# Patient Record
Sex: FEMALE | Race: WHITE | Employment: UNEMPLOYED | URBAN - METROPOLITAN AREA
[De-identification: names, ages, dates, MRNs, and addresses within clinical notes are randomized per-mention and may not be internally consistent; named-entity substitution may affect disease eponyms.]

---

## 2018-08-18 ENCOUNTER — HOSPITAL ENCOUNTER (EMERGENCY)
Age: 23
Discharge: HOME | End: 2018-08-18
Attending: EMERGENCY MEDICINE
Payer: MEDICAID

## 2018-08-18 ENCOUNTER — APPOINTMENT (OUTPATIENT)
Dept: GENERAL RADIOLOGY | Age: 23
End: 2018-08-18
Payer: MEDICAID

## 2018-08-18 VITALS
BODY MASS INDEX: 38.13 KG/M2 | DIASTOLIC BLOOD PRESSURE: 82 MMHG | RESPIRATION RATE: 16 BRPM | OXYGEN SATURATION: 96 % | HEART RATE: 102 BPM | TEMPERATURE: 98.5 F | SYSTOLIC BLOOD PRESSURE: 110 MMHG | WEIGHT: 236.25 LBS

## 2018-08-18 DIAGNOSIS — L03.114 CELLULITIS OF LEFT UPPER EXTREMITY: Primary | ICD-10-CM

## 2018-08-18 PROCEDURE — 73140 X-RAY EXAM OF FINGER(S): CPT

## 2018-08-18 PROCEDURE — 6370000000 HC RX 637 (ALT 250 FOR IP): Performed by: EMERGENCY MEDICINE

## 2018-08-18 PROCEDURE — 99283 EMERGENCY DEPT VISIT LOW MDM: CPT

## 2018-08-18 RX ORDER — CLINDAMYCIN HYDROCHLORIDE 150 MG/1
450 CAPSULE ORAL 3 TIMES DAILY
Qty: 63 CAPSULE | Refills: 0 | Status: SHIPPED | OUTPATIENT
Start: 2018-08-18 | End: 2018-08-25

## 2018-08-18 RX ORDER — ALBUTEROL SULFATE 90 UG/1
2 AEROSOL, METERED RESPIRATORY (INHALATION) EVERY 6 HOURS PRN
COMMUNITY

## 2018-08-18 RX ORDER — HYDROCODONE BITARTRATE AND ACETAMINOPHEN 5; 325 MG/1; MG/1
2 TABLET ORAL ONCE
Status: DISCONTINUED | OUTPATIENT
Start: 2018-08-18 | End: 2018-08-18

## 2018-08-18 RX ORDER — CLINDAMYCIN HYDROCHLORIDE 150 MG/1
450 CAPSULE ORAL ONCE
Status: DISCONTINUED | OUTPATIENT
Start: 2018-08-18 | End: 2018-08-18 | Stop reason: SDUPTHER

## 2018-08-18 RX ORDER — CLINDAMYCIN HYDROCHLORIDE 300 MG/1
600 CAPSULE ORAL ONCE
Status: COMPLETED | OUTPATIENT
Start: 2018-08-18 | End: 2018-08-18

## 2018-08-18 RX ORDER — HYDROCODONE BITARTRATE AND ACETAMINOPHEN 5; 325 MG/1; MG/1
1 TABLET ORAL ONCE
Status: COMPLETED | OUTPATIENT
Start: 2018-08-18 | End: 2018-08-18

## 2018-08-18 RX ADMIN — CLINDAMYCIN HYDROCHLORIDE 600 MG: 300 CAPSULE ORAL at 21:26

## 2018-08-18 RX ADMIN — HYDROCODONE BITARTRATE AND ACETAMINOPHEN 1 TABLET: 5; 325 TABLET ORAL at 21:26

## 2018-08-18 ASSESSMENT — PAIN DESCRIPTION - ORIENTATION: ORIENTATION: MID;LEFT

## 2018-08-18 ASSESSMENT — PAIN DESCRIPTION - DESCRIPTORS: DESCRIPTORS: CONSTANT;THROBBING;OTHER (COMMENT)

## 2018-08-18 ASSESSMENT — PAIN SCALES - GENERAL
PAINLEVEL_OUTOF10: 8
PAINLEVEL_OUTOF10: 8

## 2018-08-18 ASSESSMENT — PAIN DESCRIPTION - LOCATION: LOCATION: FINGER (COMMENT WHICH ONE)

## 2018-08-18 ASSESSMENT — PAIN DESCRIPTION - PAIN TYPE: TYPE: ACUTE PAIN

## 2018-08-19 NOTE — ED PROVIDER NOTES
CHIEF COMPLAINT  Finger Pain (Pt was helping brother about 1 week ago with something and think she got a splinter in left middle finger. Now with redness/swelling/pain. States pulled a splinter out yesterday. Ibuprofen in use at home. )      [facility]  Zahraa Faith is a 21 y.o. female who presents to the ED complaining of left middle finger pain. Patient states that she has splinter into it. She was able to remove the splinter yesterday and has been losing however the pain and redness and swelling worsened. She states that she came in today because she thinks that she may need to be put on antibiotics. Patient states that she is not having any fevers or chills. She denies any other symptoms at this time. She states that the finger does feel full but she is able to move it and there is some throbbing inside of it. She states that movement seems to make it worse however flexion and extension is not any worse than other movement. No other complaints, modifying factors or associated symptoms. Nursing notes reviewed. Past Medical History:   Diagnosis Date    Anxiety     Asthma     Hip fracture ([facility] [address]     Leg fracture      History reviewed. No pertinent surgical history. History reviewed. No pertinent family history. Social History     Social History    Marital status: Single     Spouse name: N/A    Number of children: N/A    Years of education: N/A     Occupational History    Not on file.      Social History Main Topics    Smoking status: Current Every Day Smoker     Packs/day: 1.00     Types: Cigarettes    Smokeless tobacco: Never Used      Comment: Quit 4 months ago    Alcohol use No    Drug use: No    Sexual activity: Yes     Partners: Male     Other Topics Concern    Not on file     Social History Narrative    No narrative on file     Current Facility-Administered Medications   Medication Dose Route Frequency Provider Last Rate Last Dose    clindamycin (CLEOCIN) capsule 450 mg  450 mg Oral Once Latonia Or, DO         Current Outpatient Prescriptions   Medication Sig Dispense Refill    albuterol sulfate  (90 Base) MCG/ACT inhaler Inhale 2 puffs into the lungs every 6 hours as needed for Wheezing      clindamycin (CLEOCIN) 150 MG capsule Take 3 capsules by mouth 3 times daily for 7 days 63 capsule 0    busPIRone (BUSPAR) 5 MG tablet Take 5 mg by mouth 2 times daily      oxyCODONE-acetaminophen (PERCOCET) 5-325 MG per tablet Take 1 tablet by mouth every 4 hours as needed for Pain . 10 tablet 0     Allergies   Allergen Reactions    Tramadol        REVIEW OF SYSTEMS  6 systems reviewed, pertinent positives per HPI otherwise noted to be negative    PHYSICAL EXAM  /82   Pulse 102   Temp 98.5 °F (36.9 °C) (Oral)   Resp 16   Wt 107.2 kg (236 lb 4 oz)   LMP 08/06/2018   SpO2 96%   BMI 38.13 kg/m²   GENERAL APPEARANCE: Awake and alert. Cooperative. No acute distress. HEAD: Normocephalic. Atraumatic. EYES: PERRL. EOM's grossly intact. ENT: Mucous membranes are moist.   NECK: Supple. Normal ROM. CHEST: Equal symmetric chest rise. LUNGS: Breathing is unlabored. Speaking comfortably in full sentences. EXTREMITIES: . MAEE. No acute deformities. All extremities neurovascularly intact. There is some erythema at the PIP LEFT MIDDLE FINGER. THERE IS NO OBVIOUS FLUCTUANCE. IT IS ERYTHEMATOUS. THERE IS NO SIGNIFICANT DISCOMFORT WITH EXTENSION. THE FINGER IS NOT HELD IN EXTENSION. SKIN: Warm and dry. NEUROLOGICAL: Alert and oriented. Normal coordination. Gait normal.         RADIOLOGY  X-RAYS:  I have reviewed radiologic plain film image(s). ALL OTHER NON-PLAIN FILM IMAGES SUCH AS CT, ULTRASOUND AND MRI HAVE BEEN READ BY THE RADIOLOGIST. XR FINGER LEFT (MIN 2 VIEWS)   Final Result      1. Focal soft tissue swelling over the third finger proximal phalanx. No underlying bony abnormality seen.       2.  No discrete radiopaque foreign body identified. Underlying inflammatory or infectious process suspected. Correlate clinically. PROCEDURES    ED COURSE/MDM  Patient seen and evaluated. Patient had x-ray that does show some soft tissue swelling but no obvious abscess. There is no air. The patient was started on antibiotics while in the ED. I discussed results and plan of care with patient. I do feel patient can be safely discharged to home. Patient's vitals were rechecked when I was in the room reevaluating. The patient has a normal heart rate at this time. Recommend follow up with orthopedics and PCP in 1-2 days for re-evaluation. Reasons to RT ED discussed. Patient expresses understanding and is in agreement with plan. Patient was given scripts for the following medications. I counseled patient how to take these medications. New Prescriptions    CLINDAMYCIN (CLEOCIN) 150 MG CAPSULE    Take 3 capsules by mouth 3 times daily for 7 days           CLINICAL IMPRESSION  1. Cellulitis of left upper extremity        Blood pressure 110/82, pulse 102, temperature 98.5 °F (36.9 °C), temperature source Oral, resp. rate 16, weight 107.2 kg (236 lb 4 oz), last menstrual period 08/06/2018, SpO2 96 %, not currently breastfeeding. DISPOSITION  Patient was discharged to home in good condition.        Renetta Bolden,   08/18/18 0276

## 2020-01-01 ENCOUNTER — HOSPITAL ENCOUNTER (EMERGENCY)
Age: 25
Discharge: HOME | End: 2020-10-14
Attending: STUDENT IN AN ORGANIZED HEALTH CARE EDUCATION/TRAINING PROGRAM

## 2020-01-01 VITALS
TEMPERATURE: 98.4 F | SYSTOLIC BLOOD PRESSURE: 116 MMHG | BODY MASS INDEX: 37.12 KG/M2 | OXYGEN SATURATION: 98 % | HEART RATE: 74 BPM | RESPIRATION RATE: 24 BRPM | DIASTOLIC BLOOD PRESSURE: 73 MMHG | WEIGHT: 230 LBS

## 2020-01-01 PROCEDURE — 99284 EMERGENCY DEPT VISIT MOD MDM: CPT

## 2020-01-01 ASSESSMENT — ENCOUNTER SYMPTOMS
VOMITING: 0
BACK PAIN: 0
PHOTOPHOBIA: 0
COUGH: 0
NAUSEA: 0
ABDOMINAL PAIN: 0
SORE THROAT: 0
STRIDOR: 0
RHINORRHEA: 0
SHORTNESS OF BREATH: 0

## 2020-10-14 NOTE — ED PROVIDER NOTES
[address] ED  EMERGENCY DEPARTMENT ENCOUNTER      Pt Name: Cayden Darling  MRN: [id number]  [birth year][place]fdayami 1995  Date of evaluation: 10/14/2020  Provider: Jeremy [address]       Chief Complaint   Patient presents with    Drug Overdose     pt relapsed this morning. took fentanyl. was given 8 mg narcan IN prior to squad arrival.  pt was starting to come around when they got there. pt alert and oriented on arrival.         HISTORY OF PRESENT ILLNESS   (Location/Symptom, Timing/Onset, Context/Setting, Quality, Duration, Modifying Factors, Severity)  Note limiting factors. Cayden Darling is a 22 y.o. female who presents to the emergency department complaining of fentanyl overdose. Patient reports she was clear for 1-1/2 years prior to relapsing today. Patient was given 8 mg Narcan intranasal prior to arrival.  On my evaluation patient awake alert oriented no complaints. Vitals are stable. Nursing Notes were reviewed. PAST MEDICAL HISTORY     Past Medical History:   Diagnosis Date    Anxiety     Asthma     Hip fracture ([facility] [address]     Leg fracture          SURGICAL HISTORY     History reviewed. No pertinent surgical history. CURRENT MEDICATIONS       Discharge Medication List as of 10/14/2020 11:56 AM      CONTINUE these medications which have NOT CHANGED    Details   albuterol sulfate  (90 Base) MCG/ACT inhaler Inhale 2 puffs into the lungs every 6 hours as needed for WheezingHistorical Med      busPIRone (BUSPAR) 5 MG tablet Take 5 mg by mouth 2 times dailyHistorical Med      oxyCODONE-acetaminophen (PERCOCET) 5-325 MG per tablet Take 1 tablet by mouth every 4 hours as needed for Pain ., Disp-10 tablet, R-0Print             ALLERGIES     Tramadol    FAMILY HISTORY     History reviewed. No pertinent family history.        SOCIAL HISTORY       Social History     Socioeconomic History    Marital status: Single     Spouse name: None    Number of children: None    Years of education: None    Highest education level: None   Occupational History    None   Social Needs    Financial resource strain: None    Food insecurity     Worry: None     Inability: None    Transportation needs     Medical: None     Non-medical: None   Tobacco Use    Smoking status: Current Every Day Smoker     Packs/day: 1.00     Types: Cigarettes    Smokeless tobacco: Never Used    Tobacco comment: Quit 4 months ago   Substance and Sexual Activity    Alcohol use: No    Drug use: No    Sexual activity: Yes     Partners: Male   Lifestyle    Physical activity     Days per week: None     Minutes per session: None    Stress: None   Relationships    Social connections     Talks on phone: None     Gets together: None     Attends Hoah[employer] service: None     Active member of club or organization: None     Attends meetings of clubs or organizations: None     Relationship status: None    Intimate partner violence     Fear of current or ex partner: None     Emotionally abused: None     Physically abused: None     Forced sexual activity: None   Other Topics Concern    None   Social History Narrative    None       SCREENINGS        [place] Coma Scale  Eye Opening: Spontaneous  Best Verbal Response: Oriented  Best Motor Response: Obeys commands  [place] Coma Scale Score: 15                   REVIEW OF SYSTEMS    (2-9 systems for level 4, 10 or more for level 5)   Review of Systems   Constitutional: Negative for chills and fever. HENT: Negative for congestion, rhinorrhea and sore throat. Eyes: Negative for photophobia and visual disturbance. Respiratory: Negative for cough, shortness of breath and stridor. Cardiovascular: Negative for chest pain, palpitations and leg swelling. Gastrointestinal: Negative for abdominal pain, nausea and vomiting. Musculoskeletal: Negative for back pain, neck pain and neck stiffness. Skin: Negative for rash.    Allergic/Immunologic: Negative for environmental allergies. Hematological: Negative for adenopathy. Psychiatric/Behavioral: Positive for behavioral problems. Negative for confusion. PHYSICAL EXAM    (up to 7 for level 4, 8 or more for level 5)     ED Triage Vitals [10/14/20 0920]   BP Temp Temp Source Pulse Resp SpO2 Height Weight   -- 98.4 °F (36.9 °C) Oral 98 18 99 % -- 230 lb (104.3 kg)       Physical Exam  Constitutional:       General: She is not in acute distress. Appearance: She is not diaphoretic. HENT:      Head: Normocephalic and atraumatic. Eyes:      Pupils: Pupils are equal, round, and reactive to light. Neck:      Musculoskeletal: Normal range of motion and neck supple. Trachea: No tracheal deviation. Cardiovascular:      Rate and Rhythm: Normal rate and regular rhythm. Pulmonary:      Effort: Pulmonary effort is normal. No respiratory distress. Abdominal:      General: There is no distension. Palpations: Abdomen is soft. Musculoskeletal: Normal range of motion. Skin:     General: Skin is warm. Neurological:      General: No focal deficit present. Mental Status: She is oriented to person, place, and time. Sensory: No sensory deficit. Motor: No weakness. DIAGNOSTIC RESULTS     EKG: All EKG's are interpreted by the Emergency Department Physician who either signs or Co-signs this chart in the absence of a cardiologist.        RADIOLOGY:   Non-plain film images such as CT, Ultrasound and MRI are read by the radiologist. Plain radiographic images are visualized and preliminarily interpreted by the emergency physician. Interpretation per the Radiologist below, if available at the time of this note:    No orders to display         LABS:  Labs Reviewed - No data to display    All other labs were within normal range or not returned as of this dictation.     EMERGENCY DEPARTMENT COURSE and DIFFERENTIAL DIAGNOSIS/MDM:   Anais Carrasco is a 22 y.o. female who presents to the emergency department with the complaint of fentanyl overdose, did receive 8 mg IM Narcan prior to arrival.  On arrival patient awake oriented with stable vital signs, awake answering questions appropriately lung sounds are clear bilaterally, patient is not tachycardic tachypneic or hypoxic. Abdomen soft nontender. We will plan to monitor patient here in the ER, notification at this time for imaging or lab work. Patient was monitored for greater than 2 hours emergency department with out recurrence of opiate toxidrome. This time I feel patient is medically stable for discharge home in the care of family friend. Patient instructed she needs to remain near friends at all times as there is potential for Narcan to wear off and recurrence of opiate toxidrome. Patient is able to ambulate out of the ER without difficulty. CRITICAL CARE TIME   Total Critical Care time was at least 0 minutes, excluding separately reportable procedures. There was a high probability of clinically significant/life threatening deterioration in the patient's condition which required my urgent intervention. Clinical concern   Intervention     CONSULTS:  None    PROCEDURES:  Unless otherwise noted below, none     Procedures        FINAL IMPRESSION      1. Opiate overdose, accidental or unintentional, initial encounter [facility])          DISPOSITION/PLAN   DISPOSITION Decision To Discharge 10/14/2020 11:37:01 AM      PATIENT REFERRED TO:  [place] [facility]  [address] [facility]  [phone]    If symptoms worsen      DISCHARGE MEDICATIONS:  Discharge Medication List as of 10/14/2020 11:56 AM        Controlled Substances Monitoring:     No flowsheet data found.     (Please note that portions of this note were completed with a voice recognition program.  Efforts were made to edit the dictations but occasionally words are mis-transcribed.)    Jessica Hayes DO (electronically signed)  Attending Emergency Physician            Janae Duggan DO  10/14/20 1942

## 2021-01-01 ENCOUNTER — APPOINTMENT (OUTPATIENT)
Dept: GENERAL RADIOLOGY | Age: 26
DRG: 917 | End: 2021-01-01

## 2021-01-01 ENCOUNTER — HOSPITAL ENCOUNTER (INPATIENT)
Age: 26
LOS: 1 days | DRG: 917 | End: 2021-05-06
Attending: EMERGENCY MEDICINE | Admitting: INTERNAL MEDICINE

## 2021-01-01 ENCOUNTER — APPOINTMENT (OUTPATIENT)
Dept: CT IMAGING | Age: 26
DRG: 917 | End: 2021-01-01

## 2021-01-01 VITALS
TEMPERATURE: 99 F | RESPIRATION RATE: 25 BRPM | OXYGEN SATURATION: 98 % | WEIGHT: 230.8 LBS | HEART RATE: 130 BPM | SYSTOLIC BLOOD PRESSURE: 94 MMHG | DIASTOLIC BLOOD PRESSURE: 57 MMHG

## 2021-01-01 DIAGNOSIS — G93.1 ANOXIC BRAIN INJURY (HCC): ICD-10-CM

## 2021-01-01 DIAGNOSIS — I46.9 CARDIAC ARREST (HCC): Primary | ICD-10-CM

## 2021-01-01 LAB
A/G RATIO: 1.1 (ref 1.1–2.2)
ACETAMINOPHEN LEVEL: <5 UG/ML (ref 10–30)
ALBUMIN SERPL-MCNC: 2.9 G/DL (ref 3.4–5)
ALP BLD-CCNC: 211 U/L (ref 40–129)
ALT SERPL-CCNC: 123 U/L (ref 10–40)
AMPHETAMINE SCREEN, URINE: POSITIVE
ANION GAP SERPL CALCULATED.3IONS-SCNC: 12 MMOL/L (ref 3–16)
ANION GAP SERPL CALCULATED.3IONS-SCNC: 20 MMOL/L (ref 3–16)
AST SERPL-CCNC: 215 U/L (ref 15–37)
ATYPICAL LYMPHOCYTE RELATIVE PERCENT: 1 % (ref 0–6)
BACTERIA: ABNORMAL /HPF
BANDED NEUTROPHILS RELATIVE PERCENT: 30 % (ref 0–7)
BANDED NEUTROPHILS RELATIVE PERCENT: 35 % (ref 0–7)
BARBITURATE SCREEN URINE: ABNORMAL
BASE EXCESS ARTERIAL: -12.7 MMOL/L (ref -3–3)
BASE EXCESS ARTERIAL: -9 (ref -3–3)
BASE EXCESS VENOUS: -18.9 MMOL/L (ref -3–3)
BASOPHILS ABSOLUTE: 0 K/UL (ref 0–0.2)
BASOPHILS ABSOLUTE: 0 K/UL (ref 0–0.2)
BASOPHILS RELATIVE PERCENT: 0 %
BASOPHILS RELATIVE PERCENT: 0 %
BENZODIAZEPINE SCREEN, URINE: ABNORMAL
BILIRUB SERPL-MCNC: 0.9 MG/DL (ref 0–1)
BILIRUBIN URINE: NEGATIVE
BLOOD, URINE: ABNORMAL
BUN BLDV-MCNC: 16 MG/DL (ref 7–20)
BUN BLDV-MCNC: 9 MG/DL (ref 7–20)
CALCIUM IONIZED: 1 MMOL/L (ref 1.12–1.32)
CALCIUM SERPL-MCNC: 7.8 MG/DL (ref 8.3–10.6)
CALCIUM SERPL-MCNC: 7.8 MG/DL (ref 8.3–10.6)
CANNABINOID SCREEN URINE: ABNORMAL
CARBOXYHEMOGLOBIN ARTERIAL: 0.1 % (ref 0–1.5)
CARBOXYHEMOGLOBIN: 2.3 % (ref 0–1.5)
CHLORIDE BLD-SCNC: 104 MMOL/L (ref 99–110)
CHLORIDE BLD-SCNC: 95 MMOL/L (ref 99–110)
CLARITY: CLEAR
CO2: 14 MMOL/L (ref 21–32)
CO2: 20 MMOL/L (ref 21–32)
COCAINE METABOLITE SCREEN URINE: ABNORMAL
COLOR: YELLOW
CREAT SERPL-MCNC: 1.4 MG/DL (ref 0.6–1.1)
CREAT SERPL-MCNC: 1.6 MG/DL (ref 0.6–1.1)
EKG ATRIAL RATE: 90 BPM
EKG DIAGNOSIS: NORMAL
EKG P AXIS: 74 DEGREES
EKG P-R INTERVAL: 204 MS
EKG Q-T INTERVAL: 404 MS
EKG QRS DURATION: 108 MS
EKG QTC CALCULATION (BAZETT): 494 MS
EKG R AXIS: 75 DEGREES
EKG T AXIS: 27 DEGREES
EKG VENTRICULAR RATE: 90 BPM
EOSINOPHILS ABSOLUTE: 0.3 K/UL (ref 0–0.6)
EOSINOPHILS ABSOLUTE: 0.4 K/UL (ref 0–0.6)
EOSINOPHILS RELATIVE PERCENT: 1 %
EOSINOPHILS RELATIVE PERCENT: 1 %
EPITHELIAL CELLS, UA: ABNORMAL /HPF (ref 0–5)
ESTIMATED AVERAGE GLUCOSE: 102.5 MG/DL
GFR AFRICAN AMERICAN: 47
GFR AFRICAN AMERICAN: 55
GFR NON-AFRICAN AMERICAN: 39
GFR NON-AFRICAN AMERICAN: 45
GLOBULIN: 2.7 G/DL
GLUCOSE BLD-MCNC: 104 MG/DL (ref 70–99)
GLUCOSE BLD-MCNC: 195 MG/DL (ref 70–99)
GLUCOSE BLD-MCNC: 323 MG/DL (ref 70–99)
GLUCOSE URINE: 500 MG/DL
HBA1C MFR BLD: 5.2 %
HCG QUALITATIVE: NEGATIVE
HCO3 ARTERIAL: 19.3 MMOL/L (ref 21–29)
HCO3 ARTERIAL: 19.6 MMOL/L (ref 21–29)
HCO3 VENOUS: 12.7 MMOL/L (ref 23–29)
HCT VFR BLD CALC: 34.7 % (ref 36–48)
HCT VFR BLD CALC: 39.7 % (ref 36–48)
HEMATOLOGY PATH CONSULT: NO
HEMATOLOGY PATH CONSULT: NORMAL
HEMATOLOGY PATH CONSULT: YES
HEMOGLOBIN, ART, EXTENDED: 13.7 G/DL (ref 12–16)
HEMOGLOBIN: 10.4 G/DL (ref 12–16)
HEMOGLOBIN: 12.5 G/DL (ref 12–16)
HEMOGLOBIN: 13.4 GM/DL (ref 12–16)
KETONES, URINE: NEGATIVE MG/DL
LACTATE: 4.63 MMOL/L (ref 0.4–2)
LACTIC ACID: 11.1 MMOL/L (ref 0.4–2)
LACTIC ACID: 3.6 MMOL/L (ref 0.4–2)
LEUKOCYTE ESTERASE, URINE: NEGATIVE
LV EF: 20 %
LVEF MODALITY: NORMAL
LYMPHOCYTES ABSOLUTE: 7 K/UL (ref 1–5.1)
LYMPHOCYTES ABSOLUTE: 9.7 K/UL (ref 1–5.1)
LYMPHOCYTES RELATIVE PERCENT: 25 %
LYMPHOCYTES RELATIVE PERCENT: 26 %
Lab: ABNORMAL
MCH RBC QN AUTO: 27.2 PG (ref 26–34)
MCH RBC QN AUTO: 27.2 PG (ref 26–34)
MCHC RBC AUTO-ENTMCNC: 30.1 G/DL (ref 31–36)
MCHC RBC AUTO-ENTMCNC: 31.5 G/DL (ref 31–36)
MCV RBC AUTO: 86.5 FL (ref 80–100)
MCV RBC AUTO: 90.4 FL (ref 80–100)
METAMYELOCYTES RELATIVE PERCENT: 2 %
METAMYELOCYTES RELATIVE PERCENT: 2 %
METHADONE SCREEN, URINE: ABNORMAL
METHEMOGLOBIN ARTERIAL: 0.8 %
METHEMOGLOBIN VENOUS: 0.8 %
MICROSCOPIC EXAMINATION: YES
MONOCYTES ABSOLUTE: 0.8 K/UL (ref 0–1.3)
MONOCYTES ABSOLUTE: 1.2 K/UL (ref 0–1.3)
MONOCYTES RELATIVE PERCENT: 3 %
MONOCYTES RELATIVE PERCENT: 3 %
MUCUS: ABNORMAL /LPF
NEUTROPHILS ABSOLUTE: 18 K/UL (ref 1.7–7.7)
NEUTROPHILS ABSOLUTE: 27.5 K/UL (ref 1.7–7.7)
NEUTROPHILS RELATIVE PERCENT: 34 %
NEUTROPHILS RELATIVE PERCENT: 37 %
NITRITE, URINE: NEGATIVE
O2 CONTENT ARTERIAL: 18 ML/DL
O2 CONTENT, VEN: 16 VOL %
O2 SAT, ARTERIAL: 91.7 %
O2 SAT, ARTERIAL: 94 % (ref 93–100)
O2 SAT, VEN: 97 %
O2 THERAPY: ABNORMAL
O2 THERAPY: ABNORMAL
OPIATE SCREEN URINE: ABNORMAL
OXYCODONE URINE: ABNORMAL
PCO2 ARTERIAL: 52.5 MM HG (ref 35–45)
PCO2 ARTERIAL: 76 MMHG (ref 35–45)
PCO2, VEN: 56.2 MMHG (ref 40–50)
PDW BLD-RTO: 13.6 % (ref 12.4–15.4)
PDW BLD-RTO: 14 % (ref 12.4–15.4)
PERFORMED ON: ABNORMAL
PH ARTERIAL: 7.02 (ref 7.35–7.45)
PH ARTERIAL: 7.18 (ref 7.35–7.45)
PH UA: 8.5
PH UA: 8.5 (ref 5–8)
PH VENOUS: 6.97 (ref 7.35–7.45)
PHENCYCLIDINE SCREEN URINE: ABNORMAL
PHOSPHORUS: 7.9 MG/DL (ref 2.5–4.9)
PLATELET # BLD: 165 K/UL (ref 135–450)
PLATELET # BLD: 218 K/UL (ref 135–450)
PLATELET SLIDE REVIEW: ADEQUATE
PLATELET SLIDE REVIEW: ADEQUATE
PMV BLD AUTO: 7.2 FL (ref 5–10.5)
PMV BLD AUTO: 8.1 FL (ref 5–10.5)
PO2 ARTERIAL: 78.1 MMHG (ref 75–108)
PO2 ARTERIAL: 90.4 MM HG (ref 75–108)
PO2, VEN: 136.3 MMHG (ref 25–40)
POC HEMATOCRIT: 39 % (ref 36–48)
POC POTASSIUM: 3.3 MMOL/L (ref 3.5–5.1)
POC SAMPLE TYPE: ABNORMAL
POC SODIUM: 138 MMOL/L (ref 136–145)
POTASSIUM REFLEX MAGNESIUM: 4.1 MMOL/L (ref 3.5–5.1)
POTASSIUM SERPL-SCNC: 4.3 MMOL/L (ref 3.5–5.1)
POTASSIUM SERPL-SCNC: 7.3 MMOL/L (ref 3.5–5.1)
PRO-BNP: 502 PG/ML (ref 0–124)
PROPOXYPHENE SCREEN: ABNORMAL
PROTEIN UA: >=300 MG/DL
RBC # BLD: 3.84 M/UL (ref 4–5.2)
RBC # BLD: 4.59 M/UL (ref 4–5.2)
RBC # BLD: NORMAL 10*6/UL
RBC # BLD: NORMAL 10*6/UL
RBC UA: ABNORMAL /HPF (ref 0–4)
SALICYLATE, SERUM: <0.3 MG/DL (ref 15–30)
SODIUM BLD-SCNC: 129 MMOL/L (ref 136–145)
SODIUM BLD-SCNC: 136 MMOL/L (ref 136–145)
SPECIFIC GRAVITY UA: 1.02 (ref 1–1.03)
TCO2 ARTERIAL: 21 MMOL/L
TCO2 ARTERIAL: 21.7 MMOL/L
TCO2 CALC VENOUS: 14 MMOL/L
TOTAL CK: 2921 U/L (ref 26–192)
TOTAL PROTEIN: 5.6 G/DL (ref 6.4–8.2)
TROPONIN: 0.38 NG/ML
URINE TYPE: ABNORMAL
UROBILINOGEN, URINE: 0.2 E.U./DL
WBC # BLD: 26.1 K/UL (ref 4–11)
WBC # BLD: 38.8 K/UL (ref 4–11)
WBC UA: ABNORMAL /HPF (ref 0–5)

## 2021-01-01 PROCEDURE — 2580000003 HC RX 258: Performed by: INTERNAL MEDICINE

## 2021-01-01 PROCEDURE — 70450 CT HEAD/BRAIN W/O DYE: CPT

## 2021-01-01 PROCEDURE — 2500000003 HC RX 250 WO HCPCS: Performed by: EMERGENCY MEDICINE

## 2021-01-01 PROCEDURE — 85014 HEMATOCRIT: CPT

## 2021-01-01 PROCEDURE — 2580000003 HC RX 258: Performed by: EMERGENCY MEDICINE

## 2021-01-01 PROCEDURE — 80143 DRUG ASSAY ACETAMINOPHEN: CPT

## 2021-01-01 PROCEDURE — 6360000002 HC RX W HCPCS: Performed by: EMERGENCY MEDICINE

## 2021-01-01 PROCEDURE — 82550 ASSAY OF CK (CPK): CPT

## 2021-01-01 PROCEDURE — 71260 CT THORAX DX C+: CPT

## 2021-01-01 PROCEDURE — 2500000003 HC RX 250 WO HCPCS: Performed by: INTERNAL MEDICINE

## 2021-01-01 PROCEDURE — 83036 HEMOGLOBIN GLYCOSYLATED A1C: CPT

## 2021-01-01 PROCEDURE — 0BH18EZ INSERTION OF ENDOTRACHEAL AIRWAY INTO TRACHEA, VIA NATURAL OR ARTIFICIAL OPENING ENDOSCOPIC: ICD-10-PCS | Performed by: EMERGENCY MEDICINE

## 2021-01-01 PROCEDURE — 83605 ASSAY OF LACTIC ACID: CPT

## 2021-01-01 PROCEDURE — 31500 INSERT EMERGENCY AIRWAY: CPT

## 2021-01-01 PROCEDURE — 82330 ASSAY OF CALCIUM: CPT

## 2021-01-01 PROCEDURE — 36415 COLL VENOUS BLD VENIPUNCTURE: CPT

## 2021-01-01 PROCEDURE — 81001 URINALYSIS AUTO W/SCOPE: CPT

## 2021-01-01 PROCEDURE — 05HM33Z INSERTION OF INFUSION DEVICE INTO RIGHT INTERNAL JUGULAR VEIN, PERCUTANEOUS APPROACH: ICD-10-PCS | Performed by: INTERNAL MEDICINE

## 2021-01-01 PROCEDURE — 80053 COMPREHEN METABOLIC PANEL: CPT

## 2021-01-01 PROCEDURE — 84295 ASSAY OF SERUM SODIUM: CPT

## 2021-01-01 PROCEDURE — 84703 CHORIONIC GONADOTROPIN ASSAY: CPT

## 2021-01-01 PROCEDURE — 93005 ELECTROCARDIOGRAM TRACING: CPT | Performed by: EMERGENCY MEDICINE

## 2021-01-01 PROCEDURE — 94002 VENT MGMT INPAT INIT DAY: CPT

## 2021-01-01 PROCEDURE — 80307 DRUG TEST PRSMV CHEM ANLYZR: CPT

## 2021-01-01 PROCEDURE — 85025 COMPLETE CBC W/AUTO DIFF WBC: CPT

## 2021-01-01 PROCEDURE — 93010 ELECTROCARDIOGRAM REPORT: CPT | Performed by: INTERNAL MEDICINE

## 2021-01-01 PROCEDURE — 82947 ASSAY GLUCOSE BLOOD QUANT: CPT

## 2021-01-01 PROCEDURE — 36620 INSERTION CATHETER ARTERY: CPT

## 2021-01-01 PROCEDURE — 96374 THER/PROPH/DIAG INJ IV PUSH: CPT

## 2021-01-01 PROCEDURE — 84100 ASSAY OF PHOSPHORUS: CPT

## 2021-01-01 PROCEDURE — 6370000000 HC RX 637 (ALT 250 FOR IP): Performed by: INTERNAL MEDICINE

## 2021-01-01 PROCEDURE — 84132 ASSAY OF SERUM POTASSIUM: CPT

## 2021-01-01 PROCEDURE — 51702 INSERT TEMP BLADDER CATH: CPT

## 2021-01-01 PROCEDURE — 87040 BLOOD CULTURE FOR BACTERIA: CPT

## 2021-01-01 PROCEDURE — 3E0G76Z INTRODUCTION OF NUTRITIONAL SUBSTANCE INTO UPPER GI, VIA NATURAL OR ARTIFICIAL OPENING: ICD-10-PCS | Performed by: INTERNAL MEDICINE

## 2021-01-01 PROCEDURE — 6370000000 HC RX 637 (ALT 250 FOR IP): Performed by: EMERGENCY MEDICINE

## 2021-01-01 PROCEDURE — 74177 CT ABD & PELVIS W/CONTRAST: CPT

## 2021-01-01 PROCEDURE — 2700000000 HC OXYGEN THERAPY PER DAY

## 2021-01-01 PROCEDURE — 99291 CRITICAL CARE FIRST HOUR: CPT | Performed by: INTERNAL MEDICINE

## 2021-01-01 PROCEDURE — 94640 AIRWAY INHALATION TREATMENT: CPT

## 2021-01-01 PROCEDURE — 82803 BLOOD GASES ANY COMBINATION: CPT

## 2021-01-01 PROCEDURE — C8929 TTE W OR WO FOL WCON,DOPPLER: HCPCS

## 2021-01-01 PROCEDURE — 71045 X-RAY EXAM CHEST 1 VIEW: CPT

## 2021-01-01 PROCEDURE — 99292 CRITICAL CARE ADDL 30 MIN: CPT | Performed by: INTERNAL MEDICINE

## 2021-01-01 PROCEDURE — 83880 ASSAY OF NATRIURETIC PEPTIDE: CPT

## 2021-01-01 PROCEDURE — 2000000000 HC ICU R&B

## 2021-01-01 PROCEDURE — 5A1935Z RESPIRATORY VENTILATION, LESS THAN 24 CONSECUTIVE HOURS: ICD-10-PCS | Performed by: EMERGENCY MEDICINE

## 2021-01-01 PROCEDURE — 80179 DRUG ASSAY SALICYLATE: CPT

## 2021-01-01 PROCEDURE — 6360000004 HC RX CONTRAST MEDICATION

## 2021-01-01 PROCEDURE — 0DH67UZ INSERTION OF FEEDING DEVICE INTO STOMACH, VIA NATURAL OR ARTIFICIAL OPENING: ICD-10-PCS | Performed by: INTERNAL MEDICINE

## 2021-01-01 PROCEDURE — 94761 N-INVAS EAR/PLS OXIMETRY MLT: CPT

## 2021-01-01 PROCEDURE — 6360000002 HC RX W HCPCS: Performed by: INTERNAL MEDICINE

## 2021-01-01 PROCEDURE — APPNB45 APP NON BILLABLE 31-45 MINUTES: Performed by: NURSE PRACTITIONER

## 2021-01-01 PROCEDURE — 84484 ASSAY OF TROPONIN QUANT: CPT

## 2021-01-01 PROCEDURE — 80048 BASIC METABOLIC PNL TOTAL CA: CPT

## 2021-01-01 PROCEDURE — 72125 CT NECK SPINE W/O DYE: CPT

## 2021-01-01 PROCEDURE — 99285 EMERGENCY DEPT VISIT HI MDM: CPT

## 2021-01-01 PROCEDURE — 71275 CT ANGIOGRAPHY CHEST: CPT

## 2021-01-01 RX ORDER — DEXTROSE MONOHYDRATE 50 MG/ML
100 INJECTION, SOLUTION INTRAVENOUS PRN
Status: DISCONTINUED | OUTPATIENT
Start: 2021-01-01 | End: 2021-01-01 | Stop reason: HOSPADM

## 2021-01-01 RX ORDER — NICOTINE POLACRILEX 4 MG
15 LOZENGE BUCCAL PRN
Status: DISCONTINUED | OUTPATIENT
Start: 2021-01-01 | End: 2021-01-01 | Stop reason: SDUPTHER

## 2021-01-01 RX ORDER — DEXTROSE MONOHYDRATE 25 G/50ML
12.5 INJECTION, SOLUTION INTRAVENOUS PRN
Status: DISCONTINUED | OUTPATIENT
Start: 2021-01-01 | End: 2021-01-01 | Stop reason: HOSPADM

## 2021-01-01 RX ORDER — INSULIN GLARGINE 100 [IU]/ML
5 INJECTION, SOLUTION SUBCUTANEOUS ONCE
Status: COMPLETED | OUTPATIENT
Start: 2021-01-01 | End: 2021-01-01

## 2021-01-01 RX ORDER — CALCIUM GLUCONATE 94 MG/ML
1000 INJECTION, SOLUTION INTRAVENOUS ONCE
Status: COMPLETED | OUTPATIENT
Start: 2021-01-01 | End: 2021-01-01

## 2021-01-01 RX ORDER — CHLORHEXIDINE GLUCONATE 0.12 MG/ML
15 RINSE ORAL 2 TIMES DAILY
Status: DISCONTINUED | OUTPATIENT
Start: 2021-01-01 | End: 2021-01-01 | Stop reason: HOSPADM

## 2021-01-01 RX ORDER — CARBOXYMETHYLCELLULOSE SODIUM 10 MG/ML
1 GEL OPHTHALMIC
Status: DISCONTINUED | OUTPATIENT
Start: 2021-01-01 | End: 2021-01-01 | Stop reason: HOSPADM

## 2021-01-01 RX ORDER — SODIUM POLYSTYRENE SULFONATE 15 G/60ML
15 SUSPENSION ORAL; RECTAL ONCE
Status: DISCONTINUED | OUTPATIENT
Start: 2021-01-01 | End: 2021-01-01 | Stop reason: HOSPADM

## 2021-01-01 RX ORDER — DEXTROSE MONOHYDRATE 25 G/50ML
12.5 INJECTION, SOLUTION INTRAVENOUS PRN
Status: DISCONTINUED | OUTPATIENT
Start: 2021-01-01 | End: 2021-01-01 | Stop reason: SDUPTHER

## 2021-01-01 RX ORDER — NICOTINE POLACRILEX 4 MG
15 LOZENGE BUCCAL PRN
Status: DISCONTINUED | OUTPATIENT
Start: 2021-01-01 | End: 2021-01-01 | Stop reason: HOSPADM

## 2021-01-01 RX ORDER — DEXTROSE MONOHYDRATE 50 MG/ML
100 INJECTION, SOLUTION INTRAVENOUS PRN
Status: DISCONTINUED | OUTPATIENT
Start: 2021-01-01 | End: 2021-01-01 | Stop reason: SDUPTHER

## 2021-01-01 RX ORDER — 0.9 % SODIUM CHLORIDE 0.9 %
30 INTRAVENOUS SOLUTION INTRAVENOUS ONCE
Status: COMPLETED | OUTPATIENT
Start: 2021-01-01 | End: 2021-01-01

## 2021-01-01 RX ORDER — DEXTROSE MONOHYDRATE 25 G/50ML
25 INJECTION, SOLUTION INTRAVENOUS ONCE
Status: COMPLETED | OUTPATIENT
Start: 2021-01-01 | End: 2021-01-01

## 2021-01-01 RX ADMIN — INSULIN HUMAN 10 UNITS: 100 INJECTION, SOLUTION PARENTERAL at 02:36

## 2021-01-01 RX ADMIN — SODIUM CHLORIDE 3129 ML: 9 INJECTION, SOLUTION INTRAVENOUS at 02:36

## 2021-01-01 RX ADMIN — SODIUM BICARBONATE 50 MEQ: 84 INJECTION, SOLUTION INTRAVENOUS at 02:36

## 2021-01-01 RX ADMIN — VASOPRESSIN 0.03 UNITS/MIN: 20 INJECTION INTRAVENOUS at 04:15

## 2021-01-01 RX ADMIN — ALBUTEROL SULFATE 10 MG: 2.5 SOLUTION RESPIRATORY (INHALATION) at 03:07

## 2021-01-01 RX ADMIN — IOPAMIDOL 75 ML: 755 INJECTION, SOLUTION INTRAVENOUS at 03:05

## 2021-01-01 RX ADMIN — DEXTROSE MONOHYDRATE 110 MCG/MIN: 50 INJECTION, SOLUTION INTRAVENOUS at 09:29

## 2021-01-01 RX ADMIN — CEFEPIME HYDROCHLORIDE 2000 MG: 2 INJECTION, POWDER, FOR SOLUTION INTRAVENOUS at 03:45

## 2021-01-01 RX ADMIN — METRONIDAZOLE 500 MG: 500 INJECTION, SOLUTION INTRAVENOUS at 06:40

## 2021-01-01 RX ADMIN — VANCOMYCIN HYDROCHLORIDE 2000 MG: 1 INJECTION, POWDER, LYOPHILIZED, FOR SOLUTION INTRAVENOUS at 05:00

## 2021-01-01 RX ADMIN — CALCIUM GLUCONATE 1000 MG: 98 INJECTION, SOLUTION INTRAVENOUS at 02:45

## 2021-01-01 RX ADMIN — SODIUM BICARBONATE 100 MEQ: 84 INJECTION, SOLUTION INTRAVENOUS at 08:29

## 2021-01-01 RX ADMIN — DEXTROSE MONOHYDRATE 25 G: 25 INJECTION, SOLUTION INTRAVENOUS at 02:36

## 2021-01-01 RX ADMIN — DEXTROSE MONOHYDRATE 5.33 MCG/MIN: 50 INJECTION, SOLUTION INTRAVENOUS at 23:44

## 2021-01-01 RX ADMIN — SODIUM BICARBONATE: 84 INJECTION, SOLUTION INTRAVENOUS at 08:47

## 2021-01-01 RX ADMIN — DEXTROSE MONOHYDRATE 70 MCG/MIN: 50 INJECTION, SOLUTION INTRAVENOUS at 06:45

## 2021-01-01 RX ADMIN — INSULIN GLARGINE 5 UNITS: 100 INJECTION, SOLUTION SUBCUTANEOUS at 04:15

## 2021-01-01 RX ADMIN — EPINEPHRINE 55 MCG/MIN: 1 INJECTION, SOLUTION, CONCENTRATE INTRAVENOUS at 09:28

## 2021-01-01 RX ADMIN — EPINEPHRINE 2 MCG/MIN: 1 INJECTION, SOLUTION, CONCENTRATE INTRAVENOUS at 06:03

## 2021-01-01 ASSESSMENT — PULMONARY FUNCTION TESTS
PIF_VALUE: 28
PIF_VALUE: 29
PIF_VALUE: 26
PIF_VALUE: 28
PIF_VALUE: 28
PIF_VALUE: 39
PIF_VALUE: 26
PIF_VALUE: 27
PIF_VALUE: 28
PIF_VALUE: 23
PIF_VALUE: 39
PIF_VALUE: 24
PIF_VALUE: 28
PIF_VALUE: 26
PIF_VALUE: 29

## 2021-05-06 PROBLEM — J96.01 ACUTE RESPIRATORY FAILURE WITH HYPOXIA AND HYPERCAPNIA (HCC): Status: ACTIVE | Noted: 2021-01-01

## 2021-05-06 PROBLEM — R65.21 SEPTIC SHOCK (HCC): Status: ACTIVE | Noted: 2021-01-01

## 2021-05-06 PROBLEM — R79.89 ELEVATED LFTS: Status: ACTIVE | Noted: 2021-01-01

## 2021-05-06 PROBLEM — T17.908A ASPIRATION INTO AIRWAY: Status: ACTIVE | Noted: 2021-01-01

## 2021-05-06 PROBLEM — J96.02 ACUTE RESPIRATORY FAILURE WITH HYPOXIA AND HYPERCAPNIA (HCC): Status: ACTIVE | Noted: 2021-01-01

## 2021-05-06 PROBLEM — R91.8 PULMONARY INFILTRATES: Status: ACTIVE | Noted: 2021-01-01

## 2021-05-06 PROBLEM — D72.825 BANDEMIA: Status: ACTIVE | Noted: 2021-01-01

## 2021-05-06 PROBLEM — I46.9 CARDIAC ARREST (HCC): Status: ACTIVE | Noted: 2021-01-01

## 2021-05-06 PROBLEM — T68.XXXA HYPOTHERMIA: Status: ACTIVE | Noted: 2021-01-01

## 2021-05-06 PROBLEM — G93.1 ANOXIC BRAIN INJURY (HCC): Status: ACTIVE | Noted: 2021-01-01

## 2021-05-06 PROBLEM — A41.9 SEPTIC SHOCK (HCC): Status: ACTIVE | Noted: 2021-01-01

## 2021-05-06 PROBLEM — R82.81 PYURIA: Status: ACTIVE | Noted: 2021-01-01

## 2021-05-06 PROBLEM — N17.9 AKI (ACUTE KIDNEY INJURY) (HCC): Status: ACTIVE | Noted: 2021-01-01

## 2021-05-06 PROBLEM — M62.82 NON-TRAUMATIC RHABDOMYOLYSIS: Status: ACTIVE | Noted: 2021-01-01

## 2021-05-06 PROBLEM — E87.20 LACTIC ACIDOSIS: Status: ACTIVE | Noted: 2021-01-01

## 2021-05-06 PROBLEM — E87.20 METABOLIC ACIDEMIA: Status: ACTIVE | Noted: 2021-01-01

## 2021-05-06 PROBLEM — L03.311 CELLULITIS OF ABDOMINAL WALL: Status: ACTIVE | Noted: 2021-01-01
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
## 2021-05-06 NOTE — CONSULTS
INPATIENT PULMONARY CRITICAL CARE CONSULT NOTE      Chief Complaint/Referring Provider:  Patient is being seen at the request of Dr. Ty Hogan for a consultation for cardiac arrest      Presenting HPI: Patient was brought to the hospital unresponsive    As per admitting provider-26 y.o. female who presented to [facility] with past medical history of IV drug usage, obesity presented to the ED after being found down. Patient was in the bathroom noted around an hour with patient not came out just he went to check on her and was noted to be unconscious unresponsive at the scene there was no pulse and patient was in cardiac arrest thus ACLS was initiated prior to arrival for 15 minutes duration and then achieved ROSC the rhythm was reported to be asystole. Patient then on arrival to the ED has already achieved ROSC. History limited as patient is encephalopathic. I spoke with the mother and cousin and the stepfather. Patient was last spoken to her on 2 days ago by the cousin.   Patient normally injects IV meth with the site in the abdomen being the normal.  Patient was critically ill on admission, patient was intubated and put on mechanical vent to support, patient was hypothermic initially, patient also had profound hypotension requiring patient to be started on pressors and the requirements of pressors have gone up over the course of the night, patient was still quite hypotensive this morning when evaluated, patient also was having worsening hypoxia which was not improving with the current ventilator settings, patient when evaluated was not on any sedation to maintain patient ventilator synchrony, patient has been essentially anuric with cumulative fluid balance of +3.2 L, patient also has been having diarrhea overnight no other pertinent review of system could be obtained because of patient's clinical status which is precarious, critical and guarded       Patient Active Problem List    Diagnosis Date Noted    Cardiopulmonary arrest with successful resuscitation ([facility] 05/06/2021    Acute respiratory failure with hypoxia and hypercapnia ([facility] 05/06/2021    Pulmonary infiltrates 05/06/2021    Aspiration into airway 05/06/2021    Anoxic brain injury ([facility] 05/06/2021    SUSANA (acute kidney injury) ([facility] 20/52/6933    Metabolic acidemia 56/86/3769    Septic shock ([facility] 05/06/2021    Cellulitis of abdominal wall 05/06/2021    Non-traumatic rhabdomyolysis 05/06/2021    Lactic acidosis 05/06/2021    Hypothermia 05/06/2021    Bandemia 05/06/2021    Elevated LFTs 05/06/2021    Pyuria 05/06/2021       No past medical history on file. No past surgical history on file. No family history on file. Social History     Tobacco Use    Smoking status: Not on file   Substance Use Topics    Alcohol use: Not on file        No Known Allergies            Physical Exam:  Blood pressure (!) 94/57, pulse 130, temperature 99 °F (37.2 °C), temperature source Bladder, resp. rate 25, weight 230 lb 12.8 oz (104.7 kg), SpO2 98 %.'   Constitutional:  No acute distress. HENT:  Oropharynx is clear and moist. No thyromegaly. Eyes:  Conjunctivae are normal. Pupils appear able to be dilated and not responsive. No scleral icterus. Neck: . No tracheal deviation present. No obvious thyroid mass. Right IJ CVC present with slight oozing, Short large neck  Cardiovascular: Sinus tachycardia, normal heart sounds. No right ventricular heave. No lower extremity edema. Pulmonary/Chest: No wheezes. Bilateral rales. Chest wall is not dull to percussion. No accessory muscle usage or stridor. Decreased breath sound density  Abdominal: Soft. Distended, patient also has a skin wound on the anterior abdominal wall with some cellulitis-like picture which may be secondary to patient injecting IV drugs in that region  Musculoskeletal: No cyanosis. No clubbing. No obvious joint deformity.    Lymphadenopathy: No cervical or supraclavicular adenopathy. Skin: Skin is warm and dry. No rash or nodules on the exposed extremities. Neurologic: Encephalopathic        Results:  CBC:   Recent Labs     05/05/21 2326 05/06/21  0450 05/06/21  0927   WBC 26.1* 38.8*  --    HGB 10.4* 12.5 13.4   HCT 34.7* 39.7  --    MCV 90.4 86.5  --     218  --      BMP:   Recent Labs     05/05/21 2326 05/06/21  0340 05/06/21  0450   *  --  136   K 7.3* 4.3 4.1   CL 95*  --  104   CO2 14*  --  20*   PHOS  --  7.9*  --    BUN 9  --  16   CREATININE 1.4*  --  1.6*     LIVER PROFILE:   Recent Labs     05/05/21 2326   *   *   BILITOT 0.9   ALKPHOS 211*     PT/INR: No results for input(s): PROTIME, INR in the last 72 hours. APTT: No results for input(s): APTT in the last 72 hours. UA:  Recent Labs     05/06/21  0035   COLORU Yellow   PHUR 8.5  8.5*   WBCUA 10-20*   RBCUA 11-20*   MUCUS 2+*   BACTERIA 1+*   CLARITYU Clear   SPECGRAV 1.025   LEUKOCYTESUR Negative   UROBILINOGEN 0.2   BILIRUBINUR Negative   BLOODU LARGE*   GLUCOSEU 500*       Imaging:  I have reviewed radiology images personally. CT ABDOMEN PELVIS W IV CONTRAST Additional Contrast? None   Final Result   1. No convincing pulmonary embolism to the segmental level. 2. Suspected left upper lobe pneumonia. 3. The more extensive consolidative opacities throughout the right lung at   the left lung base enhance relatively uniformly and are favored to represent   areas of atelectasis. 4. Grade 2 chest wall injury with right anterior chest wall hematoma. 5. Mild infectious or inflammatory enterocolitis versus shock bowel. 6. Decompressed gallbladder with apparent wall thickening, likely related to   generalized edema. CTA PULMONARY W CONTRAST   Final Result   1. No convincing pulmonary embolism to the segmental level. 2. Suspected left upper lobe pneumonia.    3. The more extensive consolidative opacities throughout the right lung at   the left lung base enhance relatively uniformly and are favored to represent   areas of atelectasis. 4. Grade 2 chest wall injury with right anterior chest wall hematoma. 5. Mild infectious or inflammatory enterocolitis versus shock bowel. 6. Decompressed gallbladder with apparent wall thickening, likely related to   generalized edema. CT HEAD WO CONTRAST   Final Result   Findings of global anoxic brain injury with diffuse cerebral edema,   effacement of the sulci and basal cisterns. CT CERVICAL SPINE WO CONTRAST   Final Result   No acute abnormality of the cervical spine. Anoxic brain injury suspected on images obtained through the base of the   brain and posterior fossa. CT head was reported separately. XR CHEST PORTABLE   Final Result   Status post ET tube placement with the tip just above the vladimir. Recommend   readjusting the tube proximally by approximately 2 cm for more physiologic   positioning. Status post gastric tube and right IJ catheter placement in good position. Mild cardiomegaly and mild central pulmonary congestion with hazy perihilar   and bibasilar opacities. The findings were called to Dr. Shady Pelletier at 11:30 p.m. Ct Head Wo Contrast    Result Date: 5/6/2021  EXAMINATION: CT OF THE HEAD WITHOUT CONTRAST  5/6/2021 12:18 am TECHNIQUE: CT of the head was performed without the administration of intravenous contrast. Dose modulation, iterative reconstruction, and/or weight based adjustment of the mA/kV was utilized to reduce the radiation dose to as low as reasonably achievable. COMPARISON: None. HISTORY: ORDERING SYSTEM PROVIDED HISTORY: cardiac arrest TECHNOLOGIST PROVIDED HISTORY: If patient is on cardiac monitor and/or pulse ox, they may be taken off cardiac monitor and pulse ox, left on O2 if currently on. All monitors reattached when patient returns to room. Has a \"code stroke\" or \"stroke alert\" been called? ->No Reason for exam:->cardiac arrest Decision Support Exception - unselect if not a suspected or confirmed emergency medical condition->Emergency Medical Condition (MA) Is the patient pregnant?->No Reason for Exam: Cardiac arrest, unresponsive, found down FINDINGS: BRAIN/VENTRICLES: Diffuse cerebral edema with loss of the normal gray-white matter interface throughout consistent with global anoxic injury. The ventricles and sulci are diffusely small. The basal cisterns are effaced. No acute intracranial hemorrhage. No significant midline shift. ORBITS: The visualized portion of the orbits demonstrate no acute abnormality. SINUSES: Partially visualized endotracheal and enteric tubes. Scattered fluid throughout the paranasal sinuses may relate to intubation. Bilateral mastoid air cells are clear. SOFT TISSUES/SKULL:  No acute abnormality of the visualized skull or soft tissues. Findings of global anoxic brain injury with diffuse cerebral edema, effacement of the sulci and basal cisterns. Ct Cervical Spine Wo Contrast    Result Date: 5/6/2021  EXAMINATION: CT OF THE CERVICAL SPINE WITHOUT CONTRAST 5/6/2021 12:18 am TECHNIQUE: CT of the cervical spine was performed without the administration of intravenous contrast. Multiplanar reformatted images are provided for review. Dose modulation, iterative reconstruction, and/or weight based adjustment of the mA/kV was utilized to reduce the radiation dose to as low as reasonably achievable. COMPARISON: Concurrent CT head. HISTORY: ORDERING SYSTEM PROVIDED HISTORY: found down TECHNOLOGIST PROVIDED HISTORY: Reason for exam:->found down Decision Support Exception - unselect if not a suspected or confirmed emergency medical condition->Emergency Medical Condition (MA) Is the patient pregnant?->No Reason for Exam: Cardiac arrest, unresponsive, found down FINDINGS: BONES/ALIGNMENT: Cervical vertebral body heights, vertebral body alignment and disc spaces are normal.  Facet joints are normally aligned.   There is no acute fracture or traumatic malalignment. DEGENERATIVE CHANGES: No significant degenerative changes. SOFT TISSUES: There is no prevertebral soft tissue swelling. There are endotracheal and OG tubes. Diffuse anoxic brain injury suspected on images obtained through the base of the brain and posterior fossa with effacement of all basal cisterns and sulci better demonstrated on the concurrent CT head. No acute abnormality of the cervical spine. Anoxic brain injury suspected on images obtained through the base of the brain and posterior fossa. CT head was reported separately. Ct Abdomen Pelvis W Iv Contrast Additional Contrast? None    Result Date: 5/6/2021  EXAMINATION: CT OF THE ABDOMEN AND PELVIS WITH CONTRAST; CTA OF THE CHEST 5/6/2021 2:33 am TECHNIQUE: CT of the abdomen and pelvis was performed with the administration of intravenous contrast. Multiplanar reformatted images are provided for review. Dose modulation, iterative reconstruction, and/or weight based adjustment of the mA/kV was utilized to reduce the radiation dose to as low as reasonably achievable.; CTA of the chest was performed after the administration of intravenous contrast.  Multiplanar reformatted images are provided for review. MIP images are provided for review. Dose modulation, iterative reconstruction, and/or weight based adjustment of the mA/kV was utilized to reduce the radiation dose to as low as reasonably achievable.  COMPARISON: None HISTORY: ORDERING SYSTEM PROVIDED HISTORY: Severe sepsis TECHNOLOGIST PROVIDED HISTORY: Reason for exam:->Severe sepsis Additional Contrast?->None Decision Support Exception - unselect if not a suspected or confirmed emergency medical condition->Emergency Medical Condition (MA) Reason for Exam: Cardiac Arrest (found facedown in bathtub by friend, evidence of IV drug use, ); ORDERING SYSTEM PROVIDED HISTORY: cardiac arrest TECHNOLOGIST PROVIDED HISTORY: Reason for exam:->cardiac arrest Reason for Exam: Cardiac Arrest (found facedown in bathtub by friend, evidence of IV drug use, ) FINDINGS: Chest: Mediastinum: Heart is normal in size. No mediastinal, hilar, or axillary lymphadenopathy. Endotracheal tube tip is in the mid intrathoracic trachea. Moderately suboptimal assessment for pulmonary embolism due to timing of the contrast bolus. No convincing pulmonary embolism to the segmental level. Lungs/pleura: Focal consolidative opacity in the left upper lobe. There are more extensive consolidative opacities throughout the right lung and at the left lung base that enhance relatively uniformly. Anderson Logan No pleural effusion or pneumothorax. Soft Tissues/Bones: Acute nondisplaced and mildly displaced fractures of the right anterior 2nd through 6th ribs with associated right anterior chest wall hematoma. Healing left rib fractures. Abdomen/Pelvis: Organs: Liver is normal in contour and enhancement. Decompressed gallbladder with apparent wall thickening. Pancreas is unremarkable. Adrenals are unremarkable. Spleen is normal in size. No renal calculi or hydronephrosis. Vasculature is unremarkable. GI/Bowel: Enteric tube tip and side hole are in gastric body and fundus. Diffusely distended small and large bowel with air-fluid levels and liquid stool. Appendix is not seen though there are no secondary signs of appendicitis. Pelvis: Unremarkable. Peritoneum/Retroperitoneum:No free fluid, free air, organized fluid collection or lymphadenopathy. Bones: Unremarkable. 1. No convincing pulmonary embolism to the segmental level. 2. Suspected left upper lobe pneumonia. 3. The more extensive consolidative opacities throughout the right lung at the left lung base enhance relatively uniformly and are favored to represent areas of atelectasis. 4. Grade 2 chest wall injury with right anterior chest wall hematoma. 5. Mild infectious or inflammatory enterocolitis versus shock bowel.  6. Decompressed gallbladder with apparent wall thickening, likely related to generalized edema. Xr Chest Portable    Result Date: 5/5/2021  EXAMINATION: ONE XRAY VIEW OF THE CHEST 5/5/2021 10:45 pm COMPARISON: None. HISTORY: ORDERING SYSTEM PROVIDED HISTORY: Code tube insertion TECHNOLOGIST PROVIDED HISTORY: Reason for exam:->Code tube insertion Reason for Exam: ett and rcvc Acuity: Acute Type of Exam: Initial FINDINGS: The heart is mildly enlarged. The pulmonary vessels are engorged and ill-defined centrally. There some hazy perihilar and bibasilar opacities. No effusion or pneumothorax is seen. There is a right IJ catheter in place the tip in the distal superior vena cava. There is an ET tube in place with the tip approximately 5 mm above the vladimir. There is a gastric tube in place with the tip looped in the fundus of the stomach. No effusion or pneumothorax is seen. Status post ET tube placement with the tip just above the vladimir. Recommend readjusting the tube proximally by approximately 2 cm for more physiologic positioning. Status post gastric tube and right IJ catheter placement in good position. Mild cardiomegaly and mild central pulmonary congestion with hazy perihilar and bibasilar opacities. The findings were called to Dr. Eladio Brothers at 11:30 p.m. Cta Pulmonary W Contrast    Result Date: 5/6/2021  EXAMINATION: CT OF THE ABDOMEN AND PELVIS WITH CONTRAST; CTA OF THE CHEST 5/6/2021 2:33 am TECHNIQUE: CT of the abdomen and pelvis was performed with the administration of intravenous contrast. Multiplanar reformatted images are provided for review. Dose modulation, iterative reconstruction, and/or weight based adjustment of the mA/kV was utilized to reduce the radiation dose to as low as reasonably achievable.; CTA of the chest was performed after the administration of intravenous contrast.  Multiplanar reformatted images are provided for review. MIP images are provided for review.  Dose modulation, iterative reconstruction, and/or weight based adjustment of the mA/kV was utilized to reduce the radiation dose to as low as reasonably achievable. COMPARISON: None HISTORY: ORDERING SYSTEM PROVIDED HISTORY: Severe sepsis TECHNOLOGIST PROVIDED HISTORY: Reason for exam:->Severe sepsis Additional Contrast?->None Decision Support Exception - unselect if not a suspected or confirmed emergency medical condition->Emergency Medical Condition (MA) Reason for Exam: Cardiac Arrest (found facedown in bathtub by friend, evidence of IV drug use, ); ORDERING SYSTEM PROVIDED HISTORY: cardiac arrest TECHNOLOGIST PROVIDED HISTORY: Reason for exam:->cardiac arrest Reason for Exam: Cardiac Arrest (found facedown in bathtub by friend, evidence of IV drug use, ) FINDINGS: Chest: Mediastinum: Heart is normal in size. No mediastinal, hilar, or axillary lymphadenopathy. Endotracheal tube tip is in the mid intrathoracic trachea. Moderately suboptimal assessment for pulmonary embolism due to timing of the contrast bolus. No convincing pulmonary embolism to the segmental level. Lungs/pleura: Focal consolidative opacity in the left upper lobe. There are more extensive consolidative opacities throughout the right lung and at the left lung base that enhance relatively uniformly. Marcie Maid No pleural effusion or pneumothorax. Soft Tissues/Bones: Acute nondisplaced and mildly displaced fractures of the right anterior 2nd through 6th ribs with associated right anterior chest wall hematoma. Healing left rib fractures. Abdomen/Pelvis: Organs: Liver is normal in contour and enhancement. Decompressed gallbladder with apparent wall thickening. Pancreas is unremarkable. Adrenals are unremarkable. Spleen is normal in size. No renal calculi or hydronephrosis. Vasculature is unremarkable. GI/Bowel: Enteric tube tip and side hole are in gastric body and fundus. Diffusely distended small and large bowel with air-fluid levels and liquid stool. Appendix is not seen though there are no secondary signs of appendicitis. Pelvis: Unremarkable. Peritoneum/Retroperitoneum:No free fluid, free air, organized fluid collection or lymphadenopathy. Bones: Unremarkable. 1. No convincing pulmonary embolism to the segmental level. 2. Suspected left upper lobe pneumonia. 3. The more extensive consolidative opacities throughout the right lung at the left lung base enhance relatively uniformly and are favored to represent areas of atelectasis. 4. Grade 2 chest wall injury with right anterior chest wall hematoma. 5. Mild infectious or inflammatory enterocolitis versus shock bowel. 6. Decompressed gallbladder with apparent wall thickening, likely related to generalized edema. Results for Justin Mcmahon (MRN [id number]) as of 5/6/2021 11:02   Ref. Range 5/6/2021 03:05 5/6/2021 03:40   Total CK Latest Ref Range: 26 - 192 U/L  2,921 (H)   Pro-BNP Latest Ref Range: 0 - 124 pg/mL  502 (H)   Troponin Latest Ref Range: <0.01 ng/mL  0.38 (H)     Results for Justin Mcmahon (MRN [id number]) as of 5/6/2021 11:02   Ref.  Range 5/5/2021 23:26 5/6/2021 04:50   WBC Latest Ref Range: 4.0 - 11.0 K/uL 26.1 (H) 38.8 (H)   RBC Latest Ref Range: 4.00 - 5.20 M/uL 3.84 (L) 4.59   Hemoglobin Quant Latest Ref Range: 12.0 - 16.0 g/dL 10.4 (L) 12.5   Hematocrit Latest Ref Range: 36.0 - 48.0 % 34.7 (L) 39.7   MCV Latest Ref Range: 80.0 - 100.0 fL 90.4 86.5   MCH Latest Ref Range: 26.0 - 34.0 pg 27.2 27.2   MCHC Latest Ref Range: 31.0 - 36.0 g/dL 30.1 (L) 31.5   MPV Latest Ref Range: 5.0 - 10.5 fL 8.1 7.2   RDW Latest Ref Range: 12.4 - 15.4 % 14.0 13.6   Platelet Count Latest Ref Range: 135 - 450 K/uL 165 218   Neutrophils % Latest Units: % 37.0 34.0   Lymphocyte % Latest Units: % 26.0 25.0   Monocytes % Latest Units: % 3.0 3.0   Eosinophils % Latest Units: % 1.0 1.0   Basophils % Latest Units: % 0.0 0.0   Neutrophils Absolute Latest Ref Range: 1.7 - 7.7 K/uL 18.0 (H) 27.5 (H)   Lymphocytes Absolute Latest Ref Range: 1.0 - 5.1 K/uL 7.0 (H) 9.7 (H) Monocytes Absolute Latest Ref Range: 0.0 - 1.3 K/uL 0.8 1.2   Eosinophils Absolute Latest Ref Range: 0.0 - 0.6 K/uL 0.3 0.4   Basophils Absolute Latest Ref Range: 0.0 - 0.2 K/uL 0.0 0.0   Bands Relative Latest Ref Range: 0 - 7 % 30 (H) 35 (H)   Atypical Lymphocytes Relative Latest Ref Range: 0 - 6 % 1    RBC Morphology Unknown Normal Normal   PLATELET SLIDE REVIEW Unknown Adequate Adequate   Metamyelocytes Relative Latest Units: % 2 (A) 2 (A)       Results for Azalia Scruggs (MRN [id number]) as of 5/6/2021 11:02   Ref.  Range 5/5/2021 23:26 5/6/2021 00:35 5/6/2021 03:40 5/6/2021 04:50   Sodium Latest Ref Range: 136 - 145 mmol/L 129 (L)   136   Potassium Latest Ref Range: 3.5 - 5.1 mmol/L 7.3 (HH)  4.3 4.1   Chloride Latest Ref Range: 99 - 110 mmol/L 95 (L)   104   CO2 Latest Ref Range: 21 - 32 mmol/L 14 (LL)   20 (L)   BUN Latest Ref Range: 7 - 20 mg/dL 9   16   Creatinine Latest Ref Range: 0.6 - 1.1 mg/dL 1.4 (H)   1.6 (H)   Anion Gap Latest Ref Range: 3 - 16  20 (H)   12   GFR Non- Latest Ref Range: >60  45 (A)   39 (A)   GFR  Latest Ref Range: >60  55 (A)   47 (A)   Lactic Acid Latest Ref Range: 0.4 - 2.0 mmol/L 11.1 (HH)  3.6 (H)    Glucose Latest Ref Range: 70 - 99 mg/dL 323 (H)   104 (H)   Calcium Latest Ref Range: 8.3 - 10.6 mg/dL 7.8 (L)   7.8 (L)   Phosphorus Latest Ref Range: 2.5 - 4.9 mg/dL   7.9 (H)    Total Protein Latest Ref Range: 6.4 - 8.2 g/dL 5.6 (L)      Total CK Latest Ref Range: 26 - 192 U/L   2,921 (H)    Pro-BNP Latest Ref Range: 0 - 124 pg/mL   502 (H)    Troponin Latest Ref Range: <0.01 ng/mL   0.38 (H)    Albumin Latest Ref Range: 3.4 - 5.0 g/dL 2.9 (L)      Globulin Latest Units: g/dL 2.7      Albumin/Globulin Ratio Latest Ref Range: 1.1 - 2.2  1.1      Alk Phos Latest Ref Range: 40 - 129 U/L 211 (H)      ALT Latest Ref Range: 10 - 40 U/L 123 (H)      AST Latest Ref Range: 15 - 37 U/L 215 (H)      Bilirubin Latest Ref Range: 0.0 - 1.0 mg/dL 0.9 Results for Micah Angulo (MRN [id number]) as of 5/6/2021 11:02   Ref. Range 5/5/2021 23:26 5/6/2021 04:50 5/6/2021 09:27   Hemoglobin, Art, Extended Latest Ref Range: 12.0 - 16.0 g/dL  13.7    pH, Arterial Latest Ref Range: 7.350 - 7.450   7.023 (LL) 7.179 (LL)   pCO2, Arterial Latest Ref Range: 35.0 - 45.0 mm Hg  76.0 (HH) 52.5 (H)   pO2, Arterial Latest Ref Range: 75.0 - 108.0 mm Hg  78.1 90.4   HCO3, Arterial Latest Ref Range: 21.0 - 29.0 mmol/L  19.3 (L) 19.6 (L)   TCO2 (calc), Art Latest Ref Range: Not Established mmol/L  21.7 21   Base Excess, Arterial Latest Ref Range: -3 - 3   -12.7 (L) -9 (L)   O2 Sat, Arterial Latest Ref Range: 93 - 100 %  91.7 (L) 94   O2 Content, Arterial Latest Ref Range: Not Established mL/dL  18    Methemoglobin, Arterial Latest Ref Range: <1.5 %  0.8    Carboxyhgb, Arterial Latest Ref Range: 0.0 - 1.5 %  0.1    pH, Yobani Latest Ref Range: 7.350 - 7.450  6.972 (LL)     pCO2, Yobani Latest Ref Range: 40.0 - 50.0 mmHg 56.2 (H)     pO2, Yobani Latest Ref Range: 25 - 40 mmHg 136.3 (H)     HCO3, Venous Latest Ref Range: 23.0 - 29.0 mmol/L 12.7 (L)     TC02 (Calc), Yobani Latest Ref Range: Not Established mmol/L 14     Base Excess, Yobani Latest Ref Range: -3.0 - 3.0 mmol/L -18.9 (L)     O2 Content, Yobani Latest Ref Range: Not Established VOL % 16     MetHgb, Yobani Latest Ref Range: <1.5 % 0.8     O2 Sat, Yobani Latest Ref Range: Not Established % 97     Sample Type Unknown   ART     Results for Micah Angulo (MRN [id number]) as of 5/6/2021 11:02   Ref.  Range 5/6/2021 00:35 5/6/2021 00:35   Color, UA Latest Ref Range: Straw/Yellow  Yellow    Clarity, UA Latest Ref Range: Clear  Clear    Glucose, UA Latest Ref Range: Negative mg/dL 500 (A)    Bilirubin, Urine Latest Ref Range: Negative  Negative    Ketones, Urine Latest Ref Range: Negative mg/dL Negative    Specific Gravity, UA Latest Ref Range: 1.005 - 1.030  1.025    Blood, Urine Latest Ref Range: Negative  LARGE (A)    pH, UA Unknown 8.5 (A) 8.5   Protein, UA Latest Ref Range: Negative mg/dL >=300 (A)    Urobilinogen, Urine Latest Ref Range: <2.0 E.U./dL 0.2    Nitrite, Urine Latest Ref Range: Negative  Negative    Leukocyte Esterase, Urine Latest Ref Range: Negative  Negative    Urine Type Unknown NotGiven    Mucus, UA Latest Ref Range: None Seen /LPF 2+ (A)    WBC, UA Latest Ref Range: 0 - 5 /HPF 10-20 (A)    RBC, UA Latest Ref Range: 0 - 4 /HPF 11-20 (A)    Epithelial Cells, UA Latest Ref Range: 0 - 5 /HPF 2-5    Bacteria, UA Latest Ref Range: None Seen /HPF 1+ (A)    Microscopic Examination Unknown YES      Results for Reggie Miranda (MRN [id number]) as of 5/6/2021 11:02   Ref. Range 5/6/2021 09:27   Lactate, ser/plas Latest Ref Range: 0.40 - 2.00 mmol/L 4.63 (HH)     Assessment:  Active Problems:    Cardiopulmonary arrest with successful resuscitation ([facility]    Acute respiratory failure with hypoxia and hypercapnia (HCC)    Pulmonary infiltrates    Aspiration into airway    Anoxic brain injury ([facility]    SUSANA (acute kidney injury) ([facility]    Metabolic acidemia    Septic shock (HCC)    Cellulitis of abdominal wall    Non-traumatic rhabdomyolysis    Lactic acidosis    Hypothermia    Bandemia    Elevated LFTs    Pyuria  Resolved Problems:    * No resolved hospital problems.  *          Plan:   · Ventilatory support to keep oxygen saturation more than 92%  · Patient was not holding her oxygen on the current ventilator settings and patient had to be put on pressure control ventilation  · Ventilator settings and waveforms reviewed  · Ventilator changes made  · Patient is not on any IV sedation to maintain patient ventilator synchrony  · Patient is encephalopathic and pupils appear to be fixed and dilated  · Patient was on 3 pressors to keep mean arterial pressure more 65 mmHg and the pressor requirements are going up  · Patient was given sodium bicarb IV push and a bicarb drip was started on the patient  · Patient was started on cefepime Flagyl and vancomycin by the admitting provider-titration as per clinical status and cultures  · Wound care as per wound care team  · Trend the WBC count  · Patient appears to have multiorgan failure secondary to hypoperfusion  · If aggressive care is pursued patient may require CRRT  · Monitor for any hypoglycemia  · Patient Is not stable for any bronchoscopy  · Lactic acid levels to be trended  · Monitor input output and BMP  · Correct electrolytes on whenever necessary basis  · PUD and DVT prophylaxis    Patient was seen and managed multiple times during the course of the day  Patient's clinical status is precarious and critical  The chances of nonsurvival are high    Patient's family were called to come and see the patient and discuss options  On the basis of patient's clinical grounds patient appears to have reached a point of reversibility and may not survive    Extensive discussion was made with the patient about her clinical status and options and was advised about considering withdrawal of care  [facility]    Critical care time spent with the patient was 90 minutes exclusive of any procedures            Electronically signed by:  Enma Eddy MD    5/6/2021    11:10 AM.

## 2021-05-06 NOTE — PROGRESS NOTES
05/06/21 0312   Vent Information   Vent Type 980   Vent Mode AC/VC   Vt Ordered 500 mL   Rate Set 16 bmp   Peak Flow 50 L/min   FiO2  100 %   Sensitivity 3   PEEP/CPAP 5   Vent Patient Data   Peak Inspiratory Pressure 28 cmH2O   Mean Airway Pressure 12 cmH20   Rate Measured 16 br/min   Vt Exhaled 507 mL   Minute Volume 8.4 Liters   I:E Ratio 1:2.4   Breath Sounds   Right Upper Lobe Diminished   Right Middle Lobe Diminished   Right Lower Lobe Diminished   Left Upper Lobe Diminished   Left Lower Lobe Diminished   ETT (adult)   Placement Date/Time: 05/05/21 7474   Type: Cuffed  Tube Size: 7.5 mm  Laryngoscope: Mac  Location: Oral  Grade View: Neither glottis nor epiglottis visible  Insertion attempts: 2  Placement Verified By[de-identified] Auscultation;Capnometry; Colorimetric EtCO2 karen. ..    Secured at 25 cm   ET Placement Left   Secured By Commercial tube fulton   Site Condition Dry   Cuff Pressure 30 cm H2O

## 2021-05-06 NOTE — CONSULTS
Thanks for consulting [facility]phrology for the care of [facility] - INPATIENT. Patient intubated next unresponsive next spoke to the staff members next plan for withdrawal of care  Therefore no nephrology services are needed    Patient does not need to be seen and will sign off    Full consult will follow  Please call with questions at-  24 Hrs Answering service [phone]  Perfect serve, or cell phone 7 am - 620 Roberto Carlos Tatum MD   [facility]  [facility]

## 2021-05-06 NOTE — PROGRESS NOTES
Arrived at ED to bring patient to ICU. Dr Eliazar Lira at bedside. Stat orders received and preparing to take patient to CT prior to taking her to room. Rectal tube inserted at this time due to liquid stools, return large amount brown liquid stool.

## 2021-05-06 NOTE — PROCEDURES
Radial Arterial Line Procedure Note              INDICATION: Shock  PROCEDURE :   ATTENDING PHYSICIAN:          CONSENT:     Consent was obtained from family prior to the procedure. Indications, risks, and benefits were explained at length. PROCEDURE SUMMARY:     A time out was performed. My hands were washed immediately prior to the procedure. I wore a surgical cap, mask with protective eyewear, sterile gown and sterile gloves throughout the procedure. After US guided test was performed to ensure adequate perfusion, the left wrist was prepped using chlorhexidine scrub and draped in sterile fashion using a three quarter sheet drape and sterile towels. The pulse was identified and the site was positioned in the usual fashion. Anesthesia was achieved using 1% lidocaine. Using the Arrow Arterial Line Kit, a needle was inserted into the artery. Arterial blood was seen to pulsate in the flash chamber. The internal guidewire was advanced easily into the artery. The catheter was then advanced over the wire and the needle and wire were withdrawn. The catheter was sutured in place. A sterile opsite was placed over the catheter at the insertion site. At the time of procedure completion, the catheter was connected to the cardiac monitor and calibrated. Appropriate waveform and blood pressure tracing was observed.    Estimated blood loss is 5 cc

## 2021-05-06 NOTE — CONSULTS
I have been asked to see this patient regarding a chest wall hematoma post CPR. Upon entering the room patient is found to be in withdrawal of care with family around the bedside. She had passed away at that point. No consult has been completed or will be completed. Jerry Kim.  Afua Pantoja MD FACS

## 2021-05-06 NOTE — PROGRESS NOTES
Extubated patient at 65 with family bedside. 2 RN's verified no lung sounds, heart tones or pulses at 1056. [facility] called at [phone]. [facility] will call back with reference umber. Family left, and provided RN with creamation number. Attending provided notified.  ICU team notified    [address] [facility] reference number: [id number]

## 2021-05-06 NOTE — PROGRESS NOTES
[address] [facility] admitted 5/5/2021 10:37 PM with cardiac arrest, found unresponsive by friends, last normal one hour prior with history of IVDU. According to EMS records, she was found facedown in a bathtub with water running. ROSC achieved prior to arriving to ED, received narcan and 5 rounds of epinephrine. CT head showed global anoxic injury. Patient seen and examined today during multidisciplinary ICU rounds. No past medical history on file. No past surgical history on file. BP (!) 94/57   Pulse 130   Temp 99 °F (37.2 °C) (Bladder)   Resp 25   Wt 230 lb 12.8 oz (104.7 kg)   LMP  (LMP Unknown)   SpO2 98%     CVC Triple Lumen 05/05/21 Right External jugular (Active)   Placement Date/Time: 05/05/21 2253   Pre-existing: No  Timeout: Patient;Procedure;Site/Side  Ultrasound guided: Yes  Hand Hygiene Performed Prior to Insertion: Yes  Site Prep: Alcohol;Betadine; Chlorhexidine  Site Prep Agent has Completely Dried Before. .. ETT (adult) (Active)   Placement Date/Time: 05/05/21 2252   Type: Cuffed  Tube Size: 7.5 mm  Laryngoscope: Mac  Location: Oral  Grade View: Neither glottis nor epiglottis visible  Insertion attempts: 2  Placement Verified By[de-identified] Auscultation;Capnometry; Colorimetric EtCO2 karen. .. Arterial Line 05/06/21 Left Radial (Active)   Placement Date/Time: 05/06/21 0500   Orientation: Left  Location: Radial  Inserted by: Dr Beverly Reasons:  Patient has not required any sedation. Pupils fixed, dilated, no gag, no response to pain. Epinephrine/Levophed/Vasopressin maxed but still hypotensive  Profound lactic acidosis, bicarb given      Called patient's family ( mother, grandmother) at the bedside. Clinical update provided. Patient's mother decided to provide comfort measures at this time and terminal extubation. [facility] notified before, to call with time of death. Zachariah at the bedside for family support. Discussed with Dr. Alvin Sharpe and patient's RN today.   Code status changed to [facility] after discussion with family. Traci N. Twylla Sacks, APRN-CNP

## 2021-05-06 NOTE — H&P
Hospital Medicine History & Physical      PCP: No primary care provider on file. Date of Admission: 5/5/2021    Date of Service: Pt seen/examined on 05/06/2021  Pt seen/examined face to face on and admitted as inpatient with expected LOS greater than two midnights due to medical therapy  Chief Complaint:    Chief Complaint   Patient presents with   Yuki 58     found facedown in bathtub by friend, evidence of IV drug use,         History Of Present Illness:      32 y.o. female who presented to [facility] with past medical history of IV drug usage, obesity presented to the ED after being found down. Patient was in the bathroom noted around an hour with patient not came out just he went to check on her and was noted to be unconscious unresponsive at the scene there was no pulse and patient was in cardiac arrest thus ACLS was initiated prior to arrival for 15 minutes duration and then achieved ROSC the rhythm was reported to be asystole. Patient then on arrival to the ED has already achieved ROSC. History limited as patient is encephalopathic. I spoke with the mother and cousin and the stepfather. Patient was last spoken to her on 2 days ago by the cousin. Patient normally injects IV meth with the site in the abdomen being the normal.      Past Medical History:    patient encephalopathic  No past medical history on file. Past Surgical History:    Patient encephalopathic  No past surgical history on file. Medications Prior to Admission:      Prior to Admission medications    Not on File       Allergies:  Patient has no allergy information on record. Social History:    Limited accuracy due to history of by patient family  Admits to smoking, no drinking, yes to IV drugs. TOBACCO:   has no history on file for tobacco.  ETOH:   has no history on file for alcohol.   E-Cigarettes/Vaping Use     Questions Responses    E-Cigarette/Vaping Use     Start Date     Passive Exposure     Quit Date     Counseling Given     Comments             Family History:      Limited as patient is encephalopathic    No family history on file. REVIEW OF SYSTEMS:     Unable to obtain    PHYSICAL EXAM PERFORMED:    BP (!) 114/90   Pulse 113   Resp (!) 0   Wt 230 lb (104.3 kg)   SpO2 94%     General appearance: Dull eyes, unresponsive  HEENT:   atraumatic, sclera anicteric, Conjunctivae clear. Neck: Supple,Trachea midline, no goiter  Respiratory:minimal accessory muscle usage, Normal respiratory effort. Intubated on ventilation mechanically. Crackles   Cardiovascular:  Regular rate and rhythm, capillary refill 6 seconds  Abdomen: Soft, minimal n-distended with normal bowel sounds. Right abdominal erythema edema and raised  Musculoskeletal:  No clubbing, cyanosis. trace edema LE bilaterally. Skin: turgor normal.  + new rashes or lesions. Neurologic: GCS 3 T      Labs:     Recent Labs     05/05/21 2326 05/06/21  0450   WBC 26.1* 38.8*   HGB 10.4* 12.5   HCT 34.7* 39.7    218     Recent Labs     05/05/21 2326 05/06/21  0340 05/06/21  0450   *  --  136   K 7.3* 4.3 4.1   CL 95*  --  104   CO2 14*  --  20*   BUN 9  --  16   CREATININE 1.4*  --  1.6*   CALCIUM 7.8*  --  7.8*   PHOS  --  7.9*  --      Recent Labs     05/05/21 2326   *   *   BILITOT 0.9   ALKPHOS 211*     No results for input(s): INR in the last 72 hours.   Recent Labs     05/06/21  0340   CKTOTAL 2,921*   TROPONINI 0.38*       Urinalysis:      Lab Results   Component Value Date    NITRU Negative 05/06/2021    WBCUA 10-20 05/06/2021    BACTERIA 1+ 05/06/2021    RBCUA 11-20 05/06/2021    BLOODU LARGE 05/06/2021    SPECGRAV 1.025 05/06/2021    GLUCOSEU 500 05/06/2021       Radiology:     CXR: I have reviewed the CXR with the following interpretation:   ET tube 2 cm from position for reassessment  EKG:  I have reviewed the EKG with the following interpretation:   Normal sinus rhythm with prolonged AL interval and QTC prolongation    CT ABDOMEN PELVIS W IV CONTRAST Additional Contrast? None   Final Result   1. No convincing pulmonary embolism to the segmental level. 2. Suspected left upper lobe pneumonia. 3. The more extensive consolidative opacities throughout the right lung at   the left lung base enhance relatively uniformly and are favored to represent   areas of atelectasis. 4. Grade 2 chest wall injury with right anterior chest wall hematoma. 5. Mild infectious or inflammatory enterocolitis versus shock bowel. 6. Decompressed gallbladder with apparent wall thickening, likely related to   generalized edema. CTA PULMONARY W CONTRAST   Final Result   1. No convincing pulmonary embolism to the segmental level. 2. Suspected left upper lobe pneumonia. 3. The more extensive consolidative opacities throughout the right lung at   the left lung base enhance relatively uniformly and are favored to represent   areas of atelectasis. 4. Grade 2 chest wall injury with right anterior chest wall hematoma. 5. Mild infectious or inflammatory enterocolitis versus shock bowel. 6. Decompressed gallbladder with apparent wall thickening, likely related to   generalized edema. CT HEAD WO CONTRAST   Final Result   Findings of global anoxic brain injury with diffuse cerebral edema,   effacement of the sulci and basal cisterns. CT CERVICAL SPINE WO CONTRAST   Final Result   No acute abnormality of the cervical spine. Anoxic brain injury suspected on images obtained through the base of the   brain and posterior fossa. CT head was reported separately. XR CHEST PORTABLE   Final Result   Status post ET tube placement with the tip just above the vladimir. Recommend   readjusting the tube proximally by approximately 2 cm for more physiologic   positioning. Status post gastric tube and right IJ catheter placement in good position.       Mild cardiomegaly and mild central pulmonary congestion with hazy perihilar   and bibasilar opacities. The findings were called to Dr. Moises Jacinto at 11:30 p.m. ASSESSMENT AND PLAN:    Active Hospital Problems    Diagnosis Date Noted    Cardiac arrest ([facility] [address] [I46.9] 05/06/2021     1. Acute encephalopathy:  CT head showing anoxic brain injury  Likely secondary to cardiac arrest  Suspect very low prognosis  Neurology consulted for assistance in prognosis potentially EEG. Unwitnessed cardiac arrest: Asystole  Etiology unclear at this time  Patient respiratory failure improved after intubation with mechanical ventilation. echocardiogram ordered  CTA abdomen chest obtained showing mild infectious enterocolitis, shock bowel, grade 2 chest wall injury with right anterior chest wall hematoma, opacities in the right lung and left lung favoring atelectasis with left upper lobe pneumonia. Critical care consultation    Acute mixed respiratory failure: Initial pH 6.97, PCO2 56, bicarb 14  Intubated on mechanical ventilation  Repeat blood gas was 7.02, PCO2 76, bicarb 19.3  Vent adjustment was changed  ABG in 2 hours    Septic shock:  Etiology suspected left abdominal cellulitis or endocarditis  On broad-spectrum antibiotics    Left abdominal cellulitis: On broad-spectrum antibiotic    Left upper lobe pneumonia: On broad-spectrum antibiotic    Anterior chest wall hematoma:  CT surgery consulted    Hyperkalemia:  Patient's initial nonhemolyzed potassium was 7.3 noted there was EKG changes of MT prolongation on EKG, SUSANA and lactic acidosis 11 thus I consulted nephrology emergently to seek recommendation if patient would benefit from dialysis. Nephrology recommended to hold off on dialysis at this time and treat medically and assess response. Nephrology notified that they will follow along, much appreciated. Resolved after medical treatments.     NSTEMI:  Likely type II in the setting of IV drug use  Echocardiogram, cardiology consulted    Shock bowel:  Continue current management as above    Rhabdomyolysis:  IVF    Diet: NPO except meds ordered    DVT Prophylaxis: Held given hematoma    Dispo:   Expected LOS greater than two midnights      Due to the immediate potential for life-threatening deterioration due to shock, acute encephalopathy, acute respiratory failure, cardiac arrest, I spent 88 minutes providing critical care. This time is excluding time spent performing procedures.        Ekaterina Brothers DO

## 2021-05-06 NOTE — ED NOTES
Bed: 04  Expected date:   Expected time:   Means of arrival:   Comments:  Cardiac arrest      Nakul Walker RN  05/05/21 6973

## 2021-05-06 NOTE — PROGRESS NOTES
05/06/21 0812   Vent Information   Skin Assessment Clean, dry, & intact   Vent Type 980   Vent Mode AC/PC  (changed by dr Joel De La Torre)   Vt Ordered 500 mL   Rate Set 25 bmp  (changed by dr Joel De La Torre)   Pressure Support 0 cmH20   FiO2  100 %   SpO2 (!) 87 %   SpO2/FiO2 ratio 87   Sensitivity 3   PEEP/CPAP 13  (changed by dr Joel De La Torre)   I Time/ I Time % 0 s   Humidification Source HME   Vent Patient Data   High Peep/I Pressure 25   Peak Inspiratory Pressure 39 cmH2O   Mean Airway Pressure 21 cmH20   Rate Measured 25 br/min   Vt Exhaled 619 mL   Minute Volume 15.5 Liters   I:E Ratio 1:2.40   Cough/Sputum   Sputum How Obtained None   Spontaneous Breathing Trial (SBT) RT Doc   Pulse 122   Breath Sounds   Right Upper Lobe Diminished   Right Middle Lobe Diminished   Right Lower Lobe Diminished   Left Upper Lobe Diminished   Left Lower Lobe Diminished   Additional Respiratory  Assessments   End Tidal CO2 42   Alarm Settings   High Pressure Alarm 50 cmH2O   Low Minute Volume Alarm 2 L/min   High Respiratory Rate 40 br/min   Low Exhaled Vt  200 mL   Patient Observation   Observations changed by dr Joel De La Torre to pc 25 Pi 25, peep 13, ambu bag at bedside, alarms on and audible, apnea parameters on   ETT (adult)   Placement Date/Time: 05/05/21 7869   Type: Cuffed  Tube Size: 7.5 mm  Laryngoscope: Mac  Location: Oral  Grade View: Neither glottis nor epiglottis visible  Insertion attempts: 2  Placement Verified By[de-identified] Auscultation;Capnometry; Colorimetric EtCO2 karen. ..    Secured at 25 cm   Measured From Lips   ET Placement Right   Secured By Commercial tube fulton   Site Condition Dry   Cuff Pressure 30 cm H2O

## 2021-05-06 NOTE — PROGRESS NOTES
Patient taken to CT for CT abd and pelvis. Trip uneventful. Vitals stable on levophed. No response of any kind noted from patient.   Patient taken to inpatient room 232 in ICU at this time

## 2021-05-07 LAB
GLUCOSE BLD-MCNC: 187 MG/DL (ref 70–99)
PERFORMED ON: ABNORMAL

## 2021-05-07 NOTE — DISCHARGE SUMMARY
Hospital Medicine Discharge Summary    Patient ID: Coby Abdi      Patient's PCP: No primary care provider on file. Admit Date: 5/5/2021     Discharge Date: 5/6/2021     Admitting Physician: Srinivasan Thomas DO     Discharge Physician: Gary Bach MD     Discharge Diagnoses: Active Hospital Problems    Diagnosis    Cardiopulmonary arrest with successful resuscitation [facility]) [I46.9]    Acute respiratory failure with hypoxia and hypercapnia ([facility] [address] [J96.01, J96.02]    Pulmonary infiltrates [R91.8]    Aspiration into airway [T17.908A]    Anoxic brain injury (HCC) [G93.1]    SUSANA (acute kidney injury) ([facility] [address] [D09.4]    Metabolic acidemia [Z97.1]    Septic shock (HCC) [A41.9, R65.21]    Cellulitis of abdominal wall [U47.322]    Non-traumatic rhabdomyolysis [M62.82]    Lactic acidosis [E87.2]    Hypothermia [T68. XXXA]    Bandemia [D72.825]    Elevated LFTs [R79.89]    Pyuria [R82.81]       The patient was seen and examined on day of discharge and this discharge summary is in conjunction with any daily progress note from day of discharge. HPI from ER physician note:   Coby Abdi is a 32 y.o. female who presents to the emergency department for cardiac arrest.  EMS said that they were called to the scene because the patient was found unresponsive. According to the person at the scene the patient was last seen approximately 1 hour prior when she went into the bathroom. When they noticed she had not come out they went in to check on her and she was found laying face down in the top of the water running. When EMS got to the scene the patient had been removed out of the top and was on her back. Initially the patient was in asystole. She has a history of IV drug use. There was paraphernalia in the bathroom. She was given 4 of Narcan and she received a total of 5 rounds of epinephrine. She had return of spontaneous circulation before arrival to the emergency room. Hospital Course:   Patient was admitted status post cardiac arrest from  suspected drug overdose as there were signs of drug paraphernalia at the scene where she was found. UDS was positive for amphetamines. She had multiorgan failure. She was on mechanical ventilation. Anoxic brain injury noted on CT. She had elevated LFTs, SUSANA with electrolyte abnormalities. She required multiple pressors for support and remained hypotensive despite aggressive therapy. Overall poor prognosis. Discussions were held with her family and they decided to withdraw care. She was terminally extubated and  soon after with family at bedside. Time of death was 10:56 AM on 2021. Significant Diagnostic Studies    Radiology:   CT ABDOMEN PELVIS W IV CONTRAST Additional Contrast? None   Final Result   1. No convincing pulmonary embolism to the segmental level. 2. Suspected left upper lobe pneumonia. 3. The more extensive consolidative opacities throughout the right lung at   the left lung base enhance relatively uniformly and are favored to represent   areas of atelectasis. 4. Grade 2 chest wall injury with right anterior chest wall hematoma. 5. Mild infectious or inflammatory enterocolitis versus shock bowel. 6. Decompressed gallbladder with apparent wall thickening, likely related to   generalized edema. CT CHEST PULMONARY EMBOLISM W CONTRAST   Final Result   1. No convincing pulmonary embolism to the segmental level. 2. Suspected left upper lobe pneumonia. 3. The more extensive consolidative opacities throughout the right lung at   the left lung base enhance relatively uniformly and are favored to represent   areas of atelectasis. 4. Grade 2 chest wall injury with right anterior chest wall hematoma. 5. Mild infectious or inflammatory enterocolitis versus shock bowel. 6. Decompressed gallbladder with apparent wall thickening, likely related to   generalized edema.          CT HEAD WO CONTRAST   Final Result   Findings of global anoxic brain injury with diffuse cerebral edema,   effacement of the sulci and basal cisterns. CT CERVICAL SPINE WO CONTRAST   Final Result   No acute abnormality of the cervical spine. Anoxic brain injury suspected on images obtained through the base of the   brain and posterior fossa. CT head was reported separately. XR CHEST PORTABLE   Final Result   Status post ET tube placement with the tip just above the vlaidmir. Recommend   readjusting the tube proximally by approximately 2 cm for more physiologic   positioning. Status post gastric tube and right IJ catheter placement in good position. Mild cardiomegaly and mild central pulmonary congestion with hazy perihilar   and bibasilar opacities. The findings were called to Dr. Rosa Jones at 11:30 p.m. Consults:     IP CONSULT TO HOSPITALIST  IP CONSULT TO NEUROLOGY  IP CONSULT TO CARDIOTHORACIC SURGERY  IP CONSULT TO PALLIATIVE CARE  IP CONSULT TO CRITICAL CARE    Disposition: Morgue    Condition at Discharge:       Discharge Instructions/Follow-up: N/A as patient     Code Status: DNR               Time Spent on discharge is more than 30 minutes in the examination, evaluation, counseling and review of medications and discharge plan. Signed:    Chandler Christopher MD   2021      Thank you No primary care provider on file. for the opportunity to be involved in this patient's care. If you have any questions or concerns please feel free to contact me at [phone].

## 2021-05-10 LAB
BLOOD CULTURE, ROUTINE: NORMAL
CULTURE, BLOOD 2: NORMAL

## 2021-08-10 NOTE — CONSULTS
Consult received for hyperkalemia     Angelito Parry is a 32 y.o. female s/p cardiac arrest. ROSC achieved. Na 129  K 7.3  Bicarb 14  Cr 1.4  Lactate 11      ECG from 2324 reviewed and there are no acute hyperkalemia changes. OK  Slightly prolonged at 204 and no peaked T waves. No bradycardia. Give k cocktail   Bicarb amp 50 meq x 3 and address the hyperglycemia.    If K > 6 with addressing acidosis and hyperglycemia or not making urine call back  At this point would not dialyze her yet    Gurmeet Marshall MD   1:57 AM      - HOPS call complete to Gwen Glover RN PICU. Francie KESSLER to swoop now